# Patient Record
Sex: MALE | Employment: FULL TIME | URBAN - METROPOLITAN AREA
[De-identification: names, ages, dates, MRNs, and addresses within clinical notes are randomized per-mention and may not be internally consistent; named-entity substitution may affect disease eponyms.]

---

## 2018-02-13 ENCOUNTER — OFFICE VISIT (OUTPATIENT)
Dept: FAMILY MEDICINE | Facility: CLINIC | Age: 58
End: 2018-02-13

## 2018-02-13 VITALS
TEMPERATURE: 97.8 F | HEART RATE: 91 BPM | DIASTOLIC BLOOD PRESSURE: 88 MMHG | HEIGHT: 71 IN | OXYGEN SATURATION: 97 % | WEIGHT: 226 LBS | BODY MASS INDEX: 31.64 KG/M2 | SYSTOLIC BLOOD PRESSURE: 138 MMHG

## 2018-02-13 DIAGNOSIS — E11.8 TYPE 2 DIABETES MELLITUS WITH COMPLICATION, WITHOUT LONG-TERM CURRENT USE OF INSULIN (H): ICD-10-CM

## 2018-02-13 DIAGNOSIS — Z02.89 HEALTH EXAMINATION OF DEFINED SUBPOPULATION: Primary | ICD-10-CM

## 2018-02-13 LAB
BILIRUB UR QL: ABNORMAL
CLARITY: CLEAR
COLOR UR: YELLOW
GLUCOSE URINE: 1000 MG/DL
HBA1C MFR BLD: 7.4 % (ref 4.3–6)
HGB UR QL: ABNORMAL
KETONES UR QL: ABNORMAL MG/DL
NITRITE UR QL STRIP: ABNORMAL
PH UR STRIP: 7 PH (ref 5–7)
PROT UR QL: ABNORMAL MG/DL
SP GR UR STRIP: 1.02 (ref 1–1.03)
SPECIMEN VOL UR: ABNORMAL ML
UROBILINOGEN UR QL STRIP: 0.2 EU/DL (ref 0.2–1)
WBC #/AREA URNS HPF: ABNORMAL /[HPF]

## 2018-02-13 PROCEDURE — 81003 URINALYSIS AUTO W/O SCOPE: CPT | Performed by: NURSE PRACTITIONER

## 2018-02-13 PROCEDURE — 36415 COLL VENOUS BLD VENIPUNCTURE: CPT | Performed by: NURSE PRACTITIONER

## 2018-02-13 PROCEDURE — 99499 UNLISTED E&M SERVICE: CPT | Performed by: NURSE PRACTITIONER

## 2018-02-13 PROCEDURE — 83036 HEMOGLOBIN GLYCOSYLATED A1C: CPT | Performed by: NURSE PRACTITIONER

## 2018-02-13 RX ORDER — LISINOPRIL 40 MG/1
40 TABLET ORAL DAILY
Qty: 90 TABLET | Refills: 1 | COMMUNITY
Start: 2018-02-13

## 2018-02-13 RX ORDER — ATORVASTATIN CALCIUM 10 MG/1
10 TABLET, FILM COATED ORAL DAILY
Qty: 30 TABLET | Refills: 1 | COMMUNITY
Start: 2018-02-13

## 2018-02-13 NOTE — PROGRESS NOTES
"Form MCSA-5875 (revised 2015)                                       B No. [id number]  Expiration Date: 2018    MEDICAL EXAMINATION REPORT FORM  (FOR  MEDICAL CERTIFICATION)    SECTION 1.  Information (to be filled out by )    PERSONAL INFORMATION  Last Name: Jonny  First Name: Gerhard Brooks Initial: ZOYA  : 60      Age: 57        Street Address: [address] [place] [address]   City: [place]   State/Province: [place]   Zip Code: [address]  's License Number: [id number]      Issuing State/Province: [place]       Phone: [phone]     Gender: male  E-mail (optional): none  CLP/CDL Applicant Sung*: yes   ID Verified by**:  Ama PAZ  Has your USDOT/FMCSA medical certificate ever been denied or issued for less than 2 years? NO   (*CLP/CDL Applicant/Sung: See instructions for definitions)  (** ID verified By:Record what type of photo ID was used to verify the identity of the , e.g., CDL,'s license, passport)     HEALTH HISTORY  Have you ever had surgery? If \"yes\", please list and explain below. NO    Are you currently taking medications (prescription, over-the-counter, herbal remedies, diet supplements)? If \"yes,\" please describe below. YES    Lipitor, Lisinopril, Omeprazole       Do you have or have you ever had:     1. Head/ brain injuries or illnesses (e.g., concussion)    NO     2. Seizures, epilepsy?   NO     3. Eye problems (except glasses or contacts)?   NO     4. Ear and/or hearing problems   NO     5. Heart disease, heart attack; bypass, or other heart problems   NO     6. Pacemaker, stents, implantable devices, or other heart procedures   NO     7. High blood pressure   YES     8. High cholesterol   YES     9. Chronic (long-term) cough, shortness of breath, or other breathing problems   NO   10. Lung disease (e.g. asthma)   NO   11. Kidney problems, kidney stones, or pain/problems with urination   NO   12. Stomach, liver, or digestive " "problems   NO   13. Diabetes or blood sugar problems        Insulin Used?   NO    NO   14. Anxiety, depression, nervousness, other mental health problems   NO   15. Fainting or passing out   NO   16. Dizziness, headaches, numbness, tingling, or memory loss?   NO   17. Unexplained weight loss   NO   18. Stroke, mini-stroke (TIA), paralysis, or weakness   NO   19. Missing or limited use of arm, hand, finger, leg, foot, toe   NO   20. Neck or back problems   NO   21. Bone, muscle, joint or nerve problems   NO   22. Blood clots or bleeding problems   NO   23. Cancer   NO   24. Chronic (long-term) infection or other chronic diseases   NO   25. Sleep disorders, pauses in breathing while asleep, daytime sleepiness, loud snoring?   NO   26. Have you ever had a sleep test (e.g. sleep apnea)?   NO   27. Have you ever spent a night in the hospital?   NO   28. Have you ever had a broken bone?   NO   29. Have you ever used or do you now use tobacco?   NO   30. Do you currently drink alcohol?   YES   31. Have you used an illegal substance within the past two years?   NO   32. Have you ever failed a drug test or been dependent on an illegal substance?   NO     Other health condition(s) not described above: NO    Did you answer \"yes\" to any of questions 1-32?  If so, please comment further on those health conditions below: NO           'S SIGNATURE  I certify that the above information is accurate and complete. I understand that inaccurate, false or missing information may invalidate the examination and my Medical Examiner's Certificate, that submission of fraudulent or intentionally false information is a violation of 49CFR 390.35, and that submission of fraudulent or intentionally false information may subject me to civil or ciminal penalties under 49 .37 and 49  Appendices A and B.     's signature:____________________________        Date: 2/13/2018                                         " "Signature if printed       Section 2. Examination Report (to be filled out by the medical examiner)      HEALTH HISTORY REVIEW  Review and discuss pertinent  answers and any available medical records. 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        none            TESTING     Pulse rate: 91     Pulse rhythm regular: YES  Height: 5 feet 10 1/2 inches  Weight: 226 pounds    Blood Pressure  Blood Pressure: 138 Systolic  88 Diastolic  Sitting yes  Second Reading (optional) not needed  Other Testing if indicated    none       Urinalysis  Urinalysis is required. Numerical readings must be recorded.  Urine Specimen Specific Gravity Protein Blood Sugar    1.020 NEGATIVE NEGATIVE +1000   Protein, blood or sugar in the urine may be an indication for further testing to rule out any underlying medical problem.    Vision  Standard is at least 20/40 acuity (Snellen) in each eye with or without correction. At least 70  field of vision in horizontal meridian measured in each eye. The use of corrective lenses should be noted on the Medical Examiner's Certificate.    ACUITY UNCORRECTED CORRECTED HORIZONTAL FIELD OF VISION   Right Eye 20/20 N/A Greater than 70 degrees   Left Eye 20/25 N/A Greater than 70 degrees   Both Eyes 20/20 N/A      Applicant can recognize and distinguish among traffic control signals and devices showing red, green and keagan colors? Yes    Monocular vision: No    Referred to ophthalmologist or optometrist?  No    Received documentation from ophthalmologist or optometrist?  No    HEARING   Standard: Must first perceive forced whispered voice at not less than 5 feet OR average hearing loss of less than or equal to 40 dB, in better ear (with or without hearing aid).    Check if hearing aid used for test:  Neither    Whispered voice test:    Record the distance from the individual at which a forced " whispered voice can first be heard:        Right Ear: 10 feet                  Left Ear: 10 feet             PHYSICAL EXAMINATION  The presence of a certain condition may not necessarily disqualify a , particularly if the condition is controlled adequately, is not likely to worsen, or is readily amenable to treatment. Even if a condition does not disqualify a , the Medical Examiner may consider deferring the  temporarily. Also, the  should be advised to take the necessary steps to correct the condition as soon as possible, particularly if neglecting the condition, could result in more serious illness that might affect driving.    Check the body systems for abnormalities.  BODY SYSTEM Normal or Abnormal   1. General  Normal   2. Skin Normal   3. Eyes Normal   4. Ears Normal   5. Mouth/throat Normal   6. Cardiovascular Normal   7. Lungs/chest Normal   8. Abdomen Normal   9. Genito-urinary System including hernias Normal   10. Back/Spine Normal   11. Extremities/joints Normal   12. Neurological system including reflexes Normal   13. Gait Normal   14. Vascular system Normal     Discuss any abnormal answers in detail in the space below and indicate whether it would affect the 's ability to operate a CMV. Enter applicable item number before each comment.     none            Please complete only one of the following (Federal or State) Medical Examiner Determination sections:    MEDICAL EXAMINER DETERMINATION (Federal)  Use this section for examinations performed in accordance with the Federal Motor Carrier Safety Regulations (49 ..49):    Meets standards in 49 .41; qualifies for 2-year certificate  Meets standards, but periodic monitoring required:   Every year, good for 1 year               If the  meets the standards outlined in 49 .41, then complete a Medical Examiner's Certificate as stated in 49 .43(h), as appropriate.     I have performed this  evaluation for certification. I have personally reviewed all available records and recorded information pertaining to this evaluation, and attest that to the best of my knowledge, I believe it to be true and correct.    Medical Examiner's Signature: _________________________________________                                                                                   (if printed)    Medical Examiner's Name: Ama Motta NP  Medical Examiner's Address:   [address] [address]  [phone]  Dept: [phone]    Date Certificate Signed: 2/13/2018    Medical Examiner's State License, Certificate, or Registration Number: [id number]    Issuing State:  [place]    Advanced Practice Nurse    National Registry Number:  [id number]                Medical Examiner's Certificate Expiration Date: 2/13/2019                          Date submitted to registry: 2/13/2018  Submitted by: Ama PAZ

## 2018-02-13 NOTE — NURSING NOTE
"No chief complaint on file.      Initial /86 (BP Location: Right arm, Patient Position: Chair, Cuff Size: Adult Large)  Pulse 91  Temp 97.8  F (36.6  C) (Oral)  Ht 5' 10.5\" (1.791 m)  Wt 226 lb (102.5 kg)  SpO2 97%  BMI 31.97 kg/m2 Estimated body mass index is 31.97 kg/(m^2) as calculated from the following:    Height as of this encounter: 5' 10.5\" (1.791 m).    Weight as of this encounter: 226 lb (102.5 kg).  Medication Reconciliation: complete     Ryan Worthy CMA      "

## 2018-02-13 NOTE — MR AVS SNAPSHOT
"              After Visit Summary   2018    Shannon [employer]    MRN: [id number]           Patient Information     Date Of Birth          1960        Visit Information        Provider Department      2018 1:00 PM Ama Motta NP [facility]        Today's Diagnoses     Health examination of defined subpopulation    -  1    Type 2 diabetes mellitus with complication, without long-term current use of insulin (H)           Follow-ups after your visit        Who to contact     If you have questions or need follow up information about today's clinic visit or your schedule please contact [facility] directly at [phone].  Normal or non-critical lab and imaging results will be communicated to you by [employer], letter or phone within 4 business days after the clinic has received the results. If you do not hear from us within 7 days, please contact the clinic through [employer] or phone. If you have a critical or abnormal lab result, we will notify you by phone as soon as possible.  Submit refill requests through [facility] or call your pharmacy and they will forward the refill request to us. Please allow 3 business days for your refill to be completed.          Additional Information About Your Visit        MyChart Information     [facility] lets you send messages to your doctor, view your test results, renew your prescriptions, schedule appointments and more. To sign up, go to [email or web address] . Click on \"Log in\" on the left side of the screen, which will take you to the Welcome page. Then click on \"Sign up Now\" on the right side of the page.     You will be asked to enter the access code listed below, as well as some personal information. Please follow the directions to create your username and password.     Your access code is: [email or web address]  Expires: 2018  2:26 PM     Your access code will  in 90 days. If you need help or a new code, please call your " "[facility] or [phone].        Care EveryWhere ID     This is your Care EveryWhere ID. This could be used by other organizations to access your [facility] medical records  [id number]        Your Vitals Were     Pulse Temperature Height Pulse Oximetry BMI (Body Mass Index)       91 97.8  F (36.6  C) (Oral) 5' 10.5\" (1.791 m) 97% 31.97 kg/m2        Blood Pressure from Last 3 Encounters:   02/13/18 138/88   02/18/16 138/84   03/10/15 138/90    Weight from Last 3 Encounters:   02/13/18 226 lb (102.5 kg)   03/03/15 238 lb (108 kg)              We Performed the Following     Hemoglobin A1c     OH U/A W/O MICRO          Today's Medication Changes          These changes are accurate as of 2/13/18  2:26 PM.  If you have any questions, ask your nurse or doctor.               Start taking these medicines.        Dose/Directions    metFORMIN 500 MG tablet   Commonly known as:  GLUCOPHAGE   Used for:  Type 2 diabetes mellitus with complication, without long-term current use of insulin (H)   Started by:  Ama Motta NP        Dose:  500 mg   Take 1 tablet (500 mg) by mouth 2 times daily (with meals)   Quantity:  180 tablet   Refills:  1            Where to get your medicines      These medications were sent to [facility] [address] [address]     Phone:  [phone]     metFORMIN 500 MG tablet                Primary Care Provider Fax #    Physician No Ref-Primary [phone]       No address on file        Equal Access to Services     ACE LINO : Mitzi madrigal Somaisha, waaxda luqadaha, qaybta kaalmada adeegyoel, kim baker. So [facility] [phone].    ATENCIÓN: Si habla español, tiene a bashir disposición servicios gratuitos de asistencia lingüística. Llame al [phone].    We comply with applicable federal civil rights laws and [place] laws. We do not discriminate on the basis of race, " color, national origin, age, disability, sex, sexual orientation, or gender identity.            Thank you!     Thank you for choosing [facility]  for your care. Our goal is always to provide you with excellent care. Hearing back from our patients is one way we can continue to improve our services. Please take a few minutes to complete the written survey that you may receive in the mail after your visit with us. Thank you!             Your Updated Medication List - Protect others around you: Learn how to safely use, store and throw away your medicines at www.disposemymeds.org.          This list is accurate as of 2/13/18  2:26 PM.  Always use your most recent med list.                   Brand Name Dispense Instructions for use Diagnosis    atorvastatin 10 MG tablet    LIPITOR    30 tablet    Take 1 tablet (10 mg) by mouth daily        lisinopril 40 MG tablet    PRINIVIL/ZESTRIL    90 tablet    Take 1 tablet (40 mg) by mouth daily        metFORMIN 500 MG tablet    GLUCOPHAGE    180 tablet    Take 1 tablet (500 mg) by mouth 2 times daily (with meals)    Type 2 diabetes mellitus with complication, without long-term current use of insulin (H)       omeprazole 20 MG CR capsule    priLOSEC    90 capsule    Take 1 capsule (20 mg) by mouth daily

## 2019-02-11 ENCOUNTER — OFFICE VISIT (OUTPATIENT)
Dept: FAMILY MEDICINE | Facility: CLINIC | Age: 59
End: 2019-02-11

## 2019-02-11 DIAGNOSIS — Z02.89 HEALTH EXAMINATION OF DEFINED SUBPOPULATION: Primary | ICD-10-CM

## 2019-02-11 LAB
BILIRUB UR QL: NORMAL
CLARITY: CLEAR
COLOR UR: YELLOW
GLUCOSE URINE: 250 MG/DL
HGB UR QL: NORMAL
KETONES UR QL: NORMAL MG/DL
NITRITE UR QL STRIP: NORMAL
PH UR STRIP: 6.5 PH (ref 5–7)
PROT UR QL: NORMAL MG/DL
SP GR UR STRIP: 1.01 (ref 1–1.03)
SPECIMEN VOL UR: NORMAL ML
UROBILINOGEN UR QL STRIP: 0.2 EU/DL (ref 0.2–1)
WBC #/AREA URNS HPF: NORMAL /[HPF]

## 2019-02-11 PROCEDURE — 99499 UNLISTED E&M SERVICE: CPT | Performed by: FAMILY MEDICINE

## 2019-02-11 PROCEDURE — 81003 URINALYSIS AUTO W/O SCOPE: CPT | Performed by: FAMILY MEDICINE

## 2019-02-11 NOTE — PROGRESS NOTES
"Form MCSA-5875                                  [facility] No. [id number]  Expiration Date: 2019  MEDICAL EXAMINATION REPORT FORM  (FOR  MEDICAL CERTIFICATION)    SECTION 1.  Information (to be filled out by )    PERSONAL INFORMATION    Last Name: Jonny  First Name: Gerhard Brooks Initial: ZOYA  : [birth year]      Age: 58        Street Address:[address] [place] [address]   City: [place]    State/Province: [place]   Zip Code: [address]  's License Number: [id number]      Issuing State/Province: [place]       Phone: [phone]     Gender: male  E-mail (optional):   CLP/CDL Applicant Sung: yes   ID Verified by  Dionna Galo CMA    Has your USDOT/FMCSA medical certificate ever been denied or issued for less than 2 years? NO     (*CLP/CDL Applicant/Sung: See instructions for definitions)  (** ID verified By:Record what type of photo ID was used to verify the identity of the , e.g., CDL,'s license, passport)       HEALTH HISTORY    Have you ever had surgery? If \"yes\", please list and explain below.  [  ] Yes [  ]  No  [  ] Not Sure         Are you currently taking medications (prescription, over-the-counter, herbal remedies, diet supplements)? If \"yes,\" please describe below.   [x  ] Yes [  ]  No  [  ] Not Sure    Lipitor 40 mg   Lostartan 100 mg       HEALTH HISTORY (continued)          Do you have or have you ever had:                                                     Not  Yes    No     Sure                                                                          Not    Yes     No   Sure    1. Head/brain injuries or illness (e.g., concussion)  x     16. Dizziness, headaches, numbness, tingling, or memory loss  x       2. Seizures, epilepsy  xx     17. Unexplained weight loss  x       3. Eye problems (except glasses or contacts)       18. Stroke, mini stroke (TIA), paralysis, or weakness  x       4. Ear and/or hearing problems  x     19. Missing or limited use of " "arm, hand, finger, leg, foot, toe  x       5. Heart disease, heart attack, bypass, or other heart problems  x     20. Neck or back problems  x       6. Pacemaker, stents, implantable devices, or other heart procedures  x     21. Bone, muscle, joint or nerve problems  x       7. High blood preassure x      22. Blood clots or bleeding problems  x       8. High cholesterol x      23. Cancer  x       9. Chronic (long term) cough, shortness of breath, or other breathing problems  x     24. Chronic (long term) infection or other chronic disease  x       10. Lung disease (e.g., asthma)  x     25. Sleep disorders, pauses in breathing while asleep, daytime sleepiness, loud snoring  x       11. Kidney problems, kidney stones, or pain/problems with urination  x     26. Have you ever had a sleep test? (e.g., sleep apnea)  x       12. Stomach, liver, or digestive problems  x     27. Have you ever spent the night in the hospital?  xx       13. Diabetes or blood sugar problems x      28. Have you ever had a broken bone?  x             Insulin used  x     29. Have you ever used or do you now use tobacco?  x       14. Anxiety, depression, nervousness, other mental health problems  x     30. Do you currently drink alcohol?   x       15. Fainting or passing out  x     31.  Have you used an illegal substance within the past two years?   x         32. Have you ever failed a drug test or been dependent on an illegal substance?   x         Other health condition(s) not described above: [  ] Yes [  ]  No  [  ] Not Sure         Did you answer \"yes\" to any of questions 1-32?  If so, please comment further on those health conditions below: [  ] Yes [  ]  No  [  ] Not Sure                'S SIGNATURE  I certify that the above information is accurate and complete. I understand that inaccurate, false or missing information may invalidate the examination and my Medical Examiner's Certificate, that submission of fraudulent or " "intentionally false information is a violation of 49CFR 390.35, and that submission of fraudulent or intentionally false information may subject me to civil or ciminal penalties under 49 .37 and 49  Appendices A and B.     's signature:____________________________        Date: 2/11/2019                                         Signature if printed       Section 2. Examination Report (to be filled out by the medical examiner)      HEALTH HISTORY REVIEW  Review and discuss pertinent  answers and any available medical records. 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       Diabetes, no medsd 7.3 on A1C today  Htn, meds  Hyperlipidemia, meds          TESTING     Pulse rate: 88     Pulse rhythm regular: NO  Height: 5'10.50\"   Weight: 225#     Blood Pressure  Blood Pressure: 138/88   Sitting   Second Reading (optional) 138/88  Other Testing if indicated           Urinalysis  Urinalysis is required. Numerical readings must be recorded.  Urine Specimen Specific Gravity Protein Blood Sugar    1.015 NEGATIVE NEGATIVE POSITIVE   Protein, blood or sugar in the urine may be an indication for further testing to rule out any underlying medical problem.  Known diabetes   Vision  Standard is at least 20/40 acuity (Snellen) in each eye with or without correction. At least 70  field of vision in horizontal meridian measured in each eye. The use of corrective lenses should be noted on the Medical Examiner's Certificate.    ACUITY UNCORRECTED CORRECTED HORIZONTAL FIELD OF VISION   Right Eye 20/25 N/A Greater than 70 degrees   Left Eye 20/32 N/A Greater than 70 degrees   Both Eyes 20/20 N/A      Applicant can recognize and distinguish among traffic control signals and devices showing red, green and keagan colors?  yes    Monocular vision: No    Referred to ophthalmologist or optometrist?  " No    Received documentation from ophthalmologist or optometrist?  No    HEARING   Standard: Must first perceive forced whispered voice at not less than 5 feet OR average hearing loss of less than or equal to 40 dB, in better ear (with or without hearing aid).    Check if hearing aid used for test:  Neither  Whispered voice test:    Record the distance from the individual at which a forced whispered voice can first be heard:        Right Ear: greater than 5 feet                  Left Ear: greater than 5 feet             PHYSICAL EXAMINATION  The presence of a certain condition may not necessarily disqualify a , particularly if the condition is controlled adequately, is not likely to worsen, or is readily amenable to treatment. Even if a condition does not disqualify a , the Medical Examiner may consider deferring the  temporarily. Also, the  should be advised to take the necessary steps to correct the condition as soon as possible, particularly if neglecting the condition, could result in more serious illness that might affect driving.    Check the body systems for abnormalities.  BODY SYSTEM Normal or Abnormal   1. General  Normal   2. Skin Normal   3. Eyes Normal   4. Ears Normal   5. Mouth/throat Normal   6. Cardiovascular Normal   7. Lungs/chest Normal   8. Abdomen Normal   9. Genito-urinary System including hernias Normal   10. Back/Spine Normal   11. Extremities/joints Normal   12. Neurological system including reflexes Normal   13. Gait Normal   14. Vascular system Normal     Discuss any abnormal answers in detail in the space below and indicate whether it would affect the 's ability to operate a CMV. Enter applicable item number before each comment.                 Please complete only one of the following (Federal or State) Medical Examiner Determination sections:    MEDICAL EXAMINER DETERMINATION (Federal)  Use this section for examinations performed in accordance with the  Federal Motor Carrier Safety Regulations (49 ..49):     qualified for: 1 year with no applicable stipulations            If the  meets the standards outlined in 49 .41, then complete a Medical Examiner's Certificate as stated in 49 .43(h), as appropriate.     I have performed this evaluation for certification. I have personally reviewed all available records and recorded information pertaining to this evaluation, and attest that to the best of my knowledge, I believe it to be true and correct.    Medical Examiner's Signature: _________________________________________                                                                                   (if printed)    Medical Examiner's Name: Mendez Kenney MD  Medical Examiner's Address:   [address] [address]  [phone]  Dept: [phone]    Date Certificate Signed: 2/11/19    Medical Examiner's State License, Certificate, or Registration Number: [id number]    Issuing State[place]    National Registry Number:  [id number]                 Medical Examiner's Certificate Expiration Date: 2/11/2020                          Date submitted to registry: 02/12/2019  Submitted by: Jonathan Gonzalez MA

## 2020-02-03 ENCOUNTER — OFFICE VISIT (OUTPATIENT)
Dept: FAMILY MEDICINE | Facility: CLINIC | Age: 60
End: 2020-02-03

## 2020-02-03 DIAGNOSIS — Z02.89 HEALTH EXAMINATION OF DEFINED SUBPOPULATION: Primary | ICD-10-CM

## 2020-02-03 LAB
BILIRUB UR QL: NORMAL
CLARITY: CLEAR
COLOR UR: YELLOW
GLUCOSE URINE: 250 MG/DL
HGB UR QL: NORMAL
KETONES UR QL: NORMAL MG/DL
NITRITE UR QL STRIP: NORMAL
PH UR STRIP: 7 PH (ref 5–7)
PROT UR QL: NORMAL MG/DL
SP GR UR STRIP: 1.02 (ref 1–1.03)
SPECIMEN VOL UR: NORMAL ML
UROBILINOGEN UR QL STRIP: 0.2 EU/DL (ref 0.2–1)
WBC #/AREA URNS HPF: NORMAL /[HPF]

## 2020-02-03 PROCEDURE — 81003 URINALYSIS AUTO W/O SCOPE: CPT | Performed by: FAMILY MEDICINE

## 2020-02-03 PROCEDURE — 99499 UNLISTED E&M SERVICE: CPT | Performed by: FAMILY MEDICINE

## 2020-02-03 NOTE — PROGRESS NOTES
"Form MCSA-5875 (revised 2015)                                       B No. [id number]  Expiration Date: 2019    MEDICAL EXAMINATION REPORT FORM  (FOR  MEDICAL CERTIFICATION)    SECTION 1.  Information (to be filled out by )    PERSONAL INFORMATION    Last Name: Jonny  First Name: Gerhard Brooks Initial: ZOYA  : 1960      Age: 59        Street Address: [address] [place] [address]   City: [place]   State/Province: [place]   Zip Code: [address]  's License Number: [id number]      Issuing State/Province: [place]       Phone: [phone]     Gender: male  E-mail (optional):   [employer] Applicant Sung*: yes   ID Verified by**:  's license  Has your [employer]OT/F[employer]SA medical certificate ever been denied or issued for less than 2 years?  NO     (*CLP/CDL Applicant/Sung: See instructions for definitions)  (** ID verified By:Record what type of photo ID was used to verify the identity of the , e.g., CDL,'s license, passport)       HEALTH HISTORY    Have you ever had surgery? If \"yes\", please list and explain below.   YES    PMH    Type 2 DM  Htn  Lipids    PSH    Appendix - 2012    Meds    Dilt-XR 120mg  Tiazac 120 mg  Hydrodiuril 12.5mg  Cozaar 100mg  Glucophage 500mg    NKDA       Are you currently taking medications (prescription, over-the-counter, herbal remedies, diet supplements)? If \"yes,\" please describe below.     YES    Dilt-XR 120mg  Tiazac 120 mg  Hydrodiuril 12.5mg  Cozaar 100mg  Glucophage 500mg      HEALTH HISTORY (continued)          Do you have or have you ever had:                                                     Not  Yes    No     Sure                                                                          Not    Yes     No   Sure    1. Head/brain injuries or illness (e.g., concussion)  NO 16. Dizziness, headaches, numbness, tingling, or memory loss   NO   2. Seizures, epilepsy  NO 17. Unexplained weight loss   NO   3. Eye " "problems (except glasses or contacts)  NO 18. Stroke, mini stroke (TIA), paralysis, or weakness   NO   4. Ear and/or hearing problems   NO 19. Missing or limited use of arm, hand, finger, leg, foot, toe   NO   5. Heart disease, heart attack, bypass, or other heart problems   NO 20. Neck or back problems   NO   6. Pacemaker, stents, implantable devices, or other heart procedures   NO 21. Bone, muscle, joint or nerve problems   NO   7. High blood preassure   Yes 22. Blood clots or bleeding problems   NO   8. High cholesterol   NO 23. Cancer   NO   9. Chronic (long term) cough, shortness of breath, or other breathing problems   NO 24. Chronic (long term) infection or other chronic disease   NO   10. Lung disease (e.g., asthma)   NO 25. Sleep disorders, pauses in breathing while asleep, daytime sleepiness, loud snoring   NO   11. Kidney problems, kidney stones, or pain/problems with urination   NO 26. Have you ever had a sleep test? (e.g., sleep apnea)   NO   12. Stomach, liver, or digestive problems   NO 27. Have you ever spent the night in the hospital?   NO   13. Diabetes or blood sugar problems   Yes 28. Have you ever had a broken bone?   NO         Insulin used   NO 29. Have you ever used or do you now use tobacco?   NO   14. Anxiety, depression, nervousness, other mental health problems  NO 30. Do you currently drink alcohol?    NO   15. Fainting or passing out   NO 31.  Have you used an illegal substance within the past two years?    NO     32. Have you ever failed a drug test or been dependent on an illegal substance?    NO     Other health condition(s) not described above:   NO    See above     Did you answer \"yes\" to any of questions 1-32?  If so, please comment further on those health conditions below:   NO                'S SIGNATURE  I certify that the above information is accurate and complete. I understand that inaccurate, false or missing information may invalidate the examination and my " "Medical Examiner's Certificate, that submission of fraudulent or intentionally false information is a violation of 49CFR 390.35, and that submission of fraudulent or intentionally false information may subject me to civil or ciminal penalties under 49 .37 and 49  Appendices A and B.     's signature:____________________________        Date: 2/3/2020                                         Signature if printed       Section 2. Examination Report (to be filled out by the medical examiner)      HEALTH HISTORY REVIEW  Review and discuss pertinent  answers and any available medical records. 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       Extensive review, see above            TESTING     Pulse rate: 87     Pulse rhythm regular: YES  Height: 6 feet 0 inches  Weight: 226 pounds    Blood Pressure  Blood Pressure: 136 Systolic  82 Diastolic  Sitting yes  Second Reading (optional) NA  Other Testing if indicated    NA       Urinalysis  Urinalysis is required. Numerical readings must be recorded.  Urine Specimen Specific Gravity Protein Blood Sugar    1.020 NEGATIVE NEGATIVE 1+   Protein, blood or sugar in the urine may be an indication for further testing to rule out any underlying medical problem.    Vision  Standard is at least 20/40 acuity (Snellen) in each eye with or without correction. At least 70  field of vision in horizontal meridian measured in each eye. The use of corrective lenses should be noted on the Medical Examiner's Certificate.    ACUITY UNCORRECTED CORRECTED HORIZONTAL FIELD OF VISION   Right Eye 20/25 N/A Greater than 70 degrees   Left Eye 20/40 N/A Greater than 70 degrees   Both Eyes 20/20 N/A      Applicant can recognize and distinguish among traffic control signals and devices showing red, green and keagan colors? Yes    Monocular vision: No    Referred to " ophthalmologist or optometrist?  No    Received documentation from ophthalmologist or optometrist?  No    HEARING   Standard: Must first perceive forced whispered voice at not less than 5 feet OR average hearing loss of less than or equal to 40 dB, in better ear (with or without hearing aid).    Check if hearing aid used for test:  Neither    Whispered voice test:    Record the distance from the individual at which a forced whispered voice can first be heard:        Right Ear: greater than 5 feet                  Left Ear: greater than 5 feet             PHYSICAL EXAMINATION  The presence of a certain condition may not necessarily disqualify a , particularly if the condition is controlled adequately, is not likely to worsen, or is readily amenable to treatment. Even if a condition does not disqualify a , the Medical Examiner may consider deferring the  temporarily. Also, the  should be advised to take the necessary steps to correct the condition as soon as possible, particularly if neglecting the condition, could result in more serious illness that might affect driving.    Check the body systems for abnormalities.  BODY SYSTEM Normal or Abnormal   1. General  Normal   2. Skin Normal   3. Eyes Normal   4. Ears Normal   5. Mouth/throat Normal   6. Cardiovascular Normal   7. Lungs/chest Normal   8. Abdomen Normal   9. Genito-urinary System including hernias Normal   10. Back/Spine Normal   11. Extremities/joints Normal   12. Neurological system including reflexes Normal   13. Gait Normal   14. Vascular system Normal     Discuss any abnormal answers in detail in the space below and indicate whether it would affect the 's ability to operate a CMV. Enter applicable item number before each comment.     no concerns            Please complete only one of the following (Federal or State) Medical Examiner Determination sections:    MEDICAL EXAMINER DETERMINATION (Federal)  Use this section for  examinations performed in accordance with the Federal Motor Carrier Safety Regulations (49 ..49):    [  ] Does not meet standards (specify reason) ________________________________________________________________________________  [  ] Meets standards in 49 .41; qualifies for 2-year certificate  [ X ] Meets standards, but periodic monitoring required (specify reason) ___________________________________________________________         qualified for:   [  ] 3 months               [  ] 6 months               [ X ] 1 year            [  ] other (specify): ________________________________  [  ]  Wearing corrective lenses        [  ] Wearing hearing aid        [  ] Accompanied by a waiver/exception(specify type): ____________________  [  ] Accompanied by a Skill Performance Evaluation (SPE) Certificate    [  ]  Qualified by operation of 49 .64 (Federal)  [  ] Driving within an exempt intracity zone (see 49 .62) (Federal)  [  ] Determination pending (specify reason) __________________________________________________________________________________        [  ] Return to medical exam office for follow-up on (must be 45 days or less _______________________________        [  ] Medical Examination Report amended (specify reason) ______________________________________________                    (if amended) Medical Examiner's Signature _____________________________________________________ Date: _______________  [  ] Incomplete examination (specify reason): _________________________________________________________________________________            If the  meets the standards outlined in 49 .41, then complete a Medical Examiner's Certificate as stated in 49 .43(h), as appropriate.     I have performed this evaluation for certification. I have personally reviewed all available records and recorded information pertaining to this evaluation, and attest that to the best of my  knowledge, I believe it to be true and correct.    Medical Examiner's Signature: _________________________________________                                                                                   (if printed)    Medical Examiner's Name: Kimani Ruiz MD  Medical Examiner's Address:   [address] [id number] [phone]  Dept: [phone]    Date Certificate Signed: 2/3/2020    Medical Examiner's State License, Certificate, or Registration Number: [id number]    Issuing State[place]    [ X ] M.D.   [  ] D.O.   [  ] Physician Assistant   [  ] Chiropractor   [  ] Advanced Practice Nurse  [  ] Other Practitioner (specify) __________________________________________________    National Registry Number:  [id number]                Medical Examiner's Certificate Expiration Date: 2/3/2021                          Date submitted to registry: 2/3/20  Submitted by: Yashira Perez Medical Assistant

## 2021-01-15 ENCOUNTER — OFFICE VISIT (OUTPATIENT)
Dept: FAMILY MEDICINE | Facility: CLINIC | Age: 61
End: 2021-01-15

## 2021-01-15 DIAGNOSIS — Z02.89 ENCOUNTER FOR EXAMINATION REQUIRED BY DEPARTMENT OF TRANSPORTATION (DOT): Primary | ICD-10-CM

## 2021-01-15 LAB
BILIRUB UR QL: NORMAL
CLARITY: CLEAR
COLOR UR: YELLOW
GLUCOSE URINE: 500 MG/DL
HGB UR QL: NORMAL
KETONES UR QL: NORMAL MG/DL
NITRITE UR QL STRIP: NORMAL
PH UR STRIP: 5.5 PH (ref 5–7)
PROT UR QL: NORMAL MG/DL
SP GR UR STRIP: 1.01 (ref 1–1.03)
SPECIMEN VOL UR: NORMAL ML
UROBILINOGEN UR QL STRIP: 0.2 EU/DL (ref 0.2–1)
WBC #/AREA URNS HPF: NORMAL /[HPF]

## 2021-01-15 PROCEDURE — 99499 UNLISTED E&M SERVICE: CPT | Performed by: FAMILY MEDICINE

## 2021-01-15 PROCEDURE — 81003 URINALYSIS AUTO W/O SCOPE: CPT | Performed by: FAMILY MEDICINE

## 2021-01-15 NOTE — PROGRESS NOTES
"Form MCSA-5875 (revised 2015)                                       B No. [id number]  Expiration Date: 2019    MEDICAL EXAMINATION REPORT FORM  (FOR  MEDICAL CERTIFICATION)    SECTION 1.  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  YES     (*CLP/CDL Applicant/Sung: See instructions for definitions)  (** ID verified By:Record what type of photo ID was used to verify the identity of the , e.g., CDL,'s license, passport)       HEALTH HISTORY    Have you ever had surgery? If \"yes\", please list and explain below.   Yes    PMH     Type 2 DM  Htn  Lipids     PSH     Appendix - 2012     Meds     Diltiazem 120 mg  Losartan 100 mg  HCTZ 12.5 mg  Metformin 500 mg  Crestor 10 mg  Omeprazole 20 mg     NKDA     Are you currently taking medications (prescription, over-the-counter, herbal remedies, diet supplements)? If \"yes,\" please describe below.     YES    As above      HEALTH HISTORY (continued)          Do you have or have you ever had:                                                     Not  Yes    No     Sure                                                                          Not    Yes     No   Sure    1. Head/brain injuries or illness (e.g., concussion)  NO 16. Dizziness, headaches, numbness, tingling, or memory loss   NO   2. Seizures, epilepsy  NO 17. Unexplained weight loss   NO   3. Eye problems (except glasses or contacts)  NO 18. " "Stroke, mini stroke (TIA), paralysis, or weakness   NO   4. Ear and/or hearing problems   NO 19. Missing or limited use of arm, hand, finger, leg, foot, toe   NO   5. Heart disease, heart attack, bypass, or other heart problems   NO 20. Neck or back problems   NO   6. Pacemaker, stents, implantable devices, or other heart procedures   NO 21. Bone, muscle, joint or nerve problems   NO   7. High blood preassure   YES 22. Blood clots or bleeding problems   NO   8. High cholesterol   YES 23. Cancer   NO   9. Chronic (long term) cough, shortness of breath, or other breathing problems   NO 24. Chronic (long term) infection or other chronic disease   NO   10. Lung disease (e.g., asthma)   NO 25. Sleep disorders, pauses in breathing while asleep, daytime sleepiness, loud snoring   NO   11. Kidney problems, kidney stones, or pain/problems with urination   NO 26. Have you ever had a sleep test? (e.g., sleep apnea)   NO   12. Stomach, liver, or digestive problems   NO 27. Have you ever spent the night in the hospital?   NO   13. Diabetes or blood sugar problems   YES 28. Have you ever had a broken bone?   NO         Insulin used   NO 29. Have you ever used or do you now use tobacco?   NO   14. Anxiety, depression, nervousness, other mental health problems  NO 30. Do you currently drink alcohol?    YES  Seldom   15. Fainting or passing out   NO 31.  Have you used an illegal substance within the past two years?    NO     32. Have you ever failed a drug test or been dependent on an illegal substance?    NO     Other health condition(s) not described above:   NO         Did you answer \"yes\" to any of questions 1-32?  If so, please comment further on those health conditions below:   YES    DM II - on medications  HTN - on medications            'S SIGNATURE  I certify that the above information is accurate and complete. I understand that inaccurate, false or missing information may invalidate the examination and my " "Medical Examiner's Certificate, that submission of fraudulent or intentionally false information is a violation of 49CFR 390.35, and that submission of fraudulent or intentionally false information may subject me to civil or ciminal penalties under 49 .37 and 49  Appendices A and B.     's signature:____________________________        Date: 1/15/2021                                         Signature if printed       Section 2. Examination Report (to be filled out by the medical examiner)      HEALTH HISTORY REVIEW  Review and discuss pertinent  answers and any available medical records. 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       Extensive review - see above            TESTING     Pulse rate: 94     Pulse rhythm regular: YES  Height: 6 feet 0 inches  Weight: 229 pounds    Blood Pressure  Blood Pressure: 136 Systolic  84 Diastolic  Sitting yes  Second Reading (optional) NA  Other Testing if indicated    NA       Urinalysis  Urinalysis is required. Numerical readings must be recorded.  Urine Specimen Specific Gravity Protein Blood Sugar    1.015 NEGATIVE NEGATIVE 2+   Protein, blood or sugar in the urine may be an indication for further testing to rule out any underlying medical problem.    Vision  Standard is at least 20/40 acuity (Snellen) in each eye with or without correction. At least 70  field of vision in horizontal meridian measured in each eye. The use of corrective lenses should be noted on the Medical Examiner's Certificate.    ACUITY UNCORRECTED CORRECTED HORIZONTAL FIELD OF VISION   Right Eye 20/20 N/A Greater than 70 degrees   Left Eye 20/25 N/A Greater than 70 degrees   Both Eyes 20/20 N/A      Applicant can recognize and distinguish among traffic control signals and devices showing red, green and keagan colors? Yes    Monocular vision: No    Referred to " ophthalmologist or optometrist?  No    Received documentation from ophthalmologist or optometrist?  No    HEARING   Standard: Must first perceive forced whispered voice at not less than 5 feet OR average hearing loss of less than or equal to 40 dB, in better ear (with or without hearing aid).    Check if hearing aid used for test:  Neither    Whispered voice test:    Record the distance from the individual at which a forced whispered voice can first be heard:        Right Ear: greater than 5 feet                  Left Ear: greater than 5 feet             PHYSICAL EXAMINATION  The presence of a certain condition may not necessarily disqualify a , particularly if the condition is controlled adequately, is not likely to worsen, or is readily amenable to treatment. Even if a condition does not disqualify a , the Medical Examiner may consider deferring the  temporarily. Also, the  should be advised to take the necessary steps to correct the condition as soon as possible, particularly if neglecting the condition, could result in more serious illness that might affect driving.    Check the body systems for abnormalities.  BODY SYSTEM Normal or Abnormal   1. General  Normal   2. Skin Normal   3. Eyes Normal   4. Ears Normal   5. Mouth/throat Normal   6. Cardiovascular Normal   7. Lungs/chest Normal   8. Abdomen Normal   9. Genito-urinary System including hernias Normal   10. Back/Spine Normal   11. Extremities/joints Normal   12. Neurological system including reflexes Normal   13. Gait Normal   14. Vascular system Normal     Discuss any abnormal answers in detail in the space below and indicate whether it would affect the 's ability to operate a CMV. Enter applicable item number before each comment.     reviewed, no concerns            Please complete only one of the following (Federal or State) Medical Examiner Determination sections:    MEDICAL EXAMINER DETERMINATION (Federal)  Use this  section for examinations performed in accordance with the Federal Motor Carrier Safety Regulations (49 ..49):    [  ] Does not meet standards (specify reason) ________________________________________________________________________________  [  ] Meets standards in 49 .41; qualifies for 2-year certificate  [ X ] Meets standards, but periodic monitoring required (specify reason) ___________________________________________________________         qualified for:   [  ] 3 months               [  ] 6 months               [ X ] 1 year            [  ] other (specify): ________________________________  [  ]  Wearing corrective lenses        [  ] Wearing hearing aid        [  ] Accompanied by a waiver/exception(specify type): ____________________  [  ] Accompanied by a Skill Performance Evaluation (SPE) Certificate    [  ]  Qualified by operation of 49 .64 (Federal)  [  ] Driving within an exempt intracity zone (see 49 .62) (Federal)  [  ] Determination pending (specify reason) __________________________________________________________________________________        [  ] Return to medical exam office for follow-up on (must be 45 days or less _______________________________        [  ] Medical Examination Report amended (specify reason) ______________________________________________                    (if amended) Medical Examiner's Signature _____________________________________________________ Date: _______________  [  ] Incomplete examination (specify reason): _________________________________________________________________________________            If the  meets the standards outlined in 49 .41, then complete a Medical Examiner's Certificate as stated in 49 .43(h), as appropriate.     I have performed this evaluation for certification. I have personally reviewed all available records and recorded information pertaining to this evaluation, and attest that to the  best of my knowledge, I believe it to be true and correct.    Medical Examiner's Signature: _________________________________________                                                                                   (if printed)    Medical Examiner's Name: Kimani Ruiz MD  Medical Examiner's Address:   [address] [address]  [phone]  Dept: [phone]    Date Certificate Signed: 1/15/2021      Medical Examiner's State License, Certificate, or Registration Number: [id number]    Issuing State[place]    [ X ] [email or web address]   [  ] D.O.   [  ] Physician Assistant   [  ] Chiropractor   [  ] Advanced Practice Nurse  [  ] Other Practitioner (specify) __________________________________________________    National Registry Number:  [id number]                Medical Examiner's Certificate Expiration Date: 01/15/2022                          Date submitted to registry: 1/19/21  Submitted by: Yashira Perez Medical Assistant

## 2022-01-07 ENCOUNTER — OFFICE VISIT (OUTPATIENT)
Dept: FAMILY MEDICINE | Facility: CLINIC | Age: 62
End: 2022-01-07

## 2022-01-07 VITALS
OXYGEN SATURATION: 98 % | DIASTOLIC BLOOD PRESSURE: 86 MMHG | TEMPERATURE: 97.2 F | SYSTOLIC BLOOD PRESSURE: 138 MMHG | HEIGHT: 72 IN | BODY MASS INDEX: 32.1 KG/M2 | WEIGHT: 237 LBS | RESPIRATION RATE: 16 BRPM | HEART RATE: 112 BPM

## 2022-01-07 DIAGNOSIS — Z02.89 ENCOUNTER FOR EXAMINATION REQUIRED BY DEPARTMENT OF TRANSPORTATION (DOT): Primary | ICD-10-CM

## 2022-01-07 LAB
BILIRUB UR QL: NORMAL
CLARITY: CLEAR
COLOR UR: YELLOW
GLUCOSE URINE: 1000 MG/DL
HGB UR QL: NORMAL
KETONES UR QL: NORMAL MG/DL
NITRITE UR QL STRIP: NORMAL
PH UR STRIP: 5.5 PH (ref 5–7)
PROT UR QL: NORMAL MG/DL
SP GR UR STRIP: 1.01 (ref 1–1.03)
SPECIMEN VOL UR: NORMAL ML
UROBILINOGEN UR QL STRIP: 0.2 EU/DL (ref 0.2–1)
WBC #/AREA URNS HPF: NORMAL /[HPF]

## 2022-01-07 PROCEDURE — 81003 URINALYSIS AUTO W/O SCOPE: CPT | Performed by: FAMILY MEDICINE

## 2022-01-07 PROCEDURE — 99499 UNLISTED E&M SERVICE: CPT | Performed by: FAMILY MEDICINE

## 2022-01-07 ASSESSMENT — MIFFLIN-ST. JEOR: SCORE: 1918.02

## 2022-01-07 NOTE — PROGRESS NOTES
"Form MCSA-5875 (revised 2015)                                       [facility] No. [id number]  Expiration Date: 2019    MEDICAL EXAMINATION REPORT FORM  (FOR  MEDICAL CERTIFICATION)    SECTION 1.  Information (to be filled out by )    PERSONAL INFORMATION    Last Name: Jonny  First Name: Gerhard Brooks Initial: ZOYA  : 1960      Age: 61        Street Address: [address] [place] [address]   City: [place]  State/Province: [place]   Zip Code: [address]  's License Number: [id number]      Issuing State/Province: [place]       Phone:[phone]     Gender: male  E-mail (optional): [email or web address]  CLP/CDL Applicant Sung*: yes   ID Verified by**:  Kayleigh Jeronimo MA  Has your USDOT/FMCSA medical certificate ever been denied or issued for less than 2 years?  NO     (*CLP/CDL Applicant/Sung: See instructions for definitions)  (** ID verified By:Record what type of photo ID was used to verify the identity of the , e.g., CDL,'s license, passport)       HEALTH HISTORY    Have you ever had surgery? If \"yes\", please list and explain below.   YES    PMH     Type 2 DM  Htn  Lipids     PSH     Appendix - 2012  Heart Ablation     Meds    Rosuvastatin 10 mg daily  hydrochlorothiazide 12.5 mg daily  Glipizide 2.5 mg daily  Omeprazole 20 mg daily    NKDA       Are you currently taking medications (prescription, over-the-counter, herbal remedies, diet supplements)? If \"yes,\" please describe below.     YES - see above          HEALTH HISTORY (continued)          Do you have or have you ever had:                                                     Not  Yes    No     Sure                                                                          Not    Yes     No   Sure    1. Head/brain injuries or illness (e.g., concussion)  NO 16. Dizziness, headaches, numbness, tingling, or memory loss   NO   2. Seizures, epilepsy  NO 17. Unexplained weight loss   NO   3. " "Eye problems (except glasses or contacts)  NO 18. Stroke, mini stroke (TIA), paralysis, or weakness   NO   4. Ear and/or hearing problems   NO 19. Missing or limited use of arm, hand, finger, leg, foot, toe   NO   5. Heart disease, heart attack, bypass, or other heart problems   NO 20. Neck or back problems   NO   6. Pacemaker, stents, implantable devices, or other heart procedures   NO 21. Bone, muscle, joint or nerve problems   NO   7. High blood preassure   YES 22. Blood clots or bleeding problems   NO   8. High cholesterol   YES 23. Cancer   NO   9. Chronic (long term) cough, shortness of breath, or other breathing problems   NO 24. Chronic (long term) infection or other chronic disease   NO   10. Lung disease (e.g., asthma)   NO 25. Sleep disorders, pauses in breathing while asleep, daytime sleepiness, loud snoring   NO   11. Kidney problems, kidney stones, or pain/problems with urination   NO 26. Have you ever had a sleep test? (e.g., sleep apnea)   NO   12. Stomach, liver, or digestive problems   NO 27. Have you ever spent the night in the hospital?   NO   13. Diabetes or blood sugar problems   Yes 28. Have you ever had a broken bone?   NO         Insulin used   NO 29. Have you ever used or do you now use tobacco?   NO   14. Anxiety, depression, nervousness, other mental health problems  NO 30. Do you currently drink alcohol?    Yes  3/wk   15. Fainting or passing out   NO 31.  Have you used an illegal substance within the past two years?    NO     32. Have you ever failed a drug test or been dependent on an illegal substance?    NO     Other health condition(s) not described above:   NO    Extensive review - see above     Did you answer \"yes\" to any of questions 1-32?  If so, please comment further on those health conditions below:   YES    Extensive review - see above            'S SIGNATURE  I certify that the above information is accurate and complete. I understand that inaccurate, false or " "missing information may invalidate the examination and my Medical Examiner's Certificate, that submission of fraudulent or intentionally false information is a violation of 49CFR 390.35, and that submission of fraudulent or intentionally false information may subject me to civil or ciminal penalties under 49 .37 and 49  Appendices A and B.     's signature:____________________________        Date: 1/7/2022                                         Signature if printed       Section 2. Examination Report (to be filled out by the medical examiner)      HEALTH HISTORY REVIEW  Review and discuss pertinent  answers and any available medical records. 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       Extensive review - see above            TESTING     Pulse rate: 112     Pulse rhythm regular: YES  Height: 6 feet 0 inches  Weight: 237 pounds    Blood Pressure  Blood Pressure: 138/86 Systolic  138 Diastolic 86  Sitting yes  Second Reading (optional)   Other Testing if indicated          Urinalysis  Urinalysis is required. Numerical readings must be recorded.  Urine Specimen Specific Gravity Protein Blood Sugar    1.010 NEGATIVE NEGATIVE NEGATIVE   Protein, blood or sugar in the urine may be an indication for further testing to rule out any underlying medical problem.    Vision  Standard is at least 20/40 acuity (Snellen) in each eye with or without correction. At least 70  field of vision in horizontal meridian measured in each eye. The use of corrective lenses should be noted on the Medical Examiner's Certificate.    ACUITY UNCORRECTED  HORIZONTAL FIELD OF VISION   Right Eye 20/20  Greater than 70 degrees   Left Eye 20/20  Greater than 70 degrees   Both Eyes 20/20       Applicant can recognize and distinguish among traffic control signals and devices showing red, green and keagan " colors? Yes    Monocular vision: No    Referred to ophthalmologist or optometrist?  No    Received documentation from ophthalmologist or optometrist?  No    HEARING   Standard: Must first perceive forced whispered voice at not less than 5 feet OR average hearing loss of less than or equal to 40 dB, in better ear (with or without hearing aid).    Check if hearing aid used for test:  Neither    Whispered voice test:    Record the distance from the individual at which a forced whispered voice can first be heard:        Right Ear: greater than 5 feet                  Left Ear: greater than 5 feet             PHYSICAL EXAMINATION  The presence of a certain condition may not necessarily disqualify a , particularly if the condition is controlled adequately, is not likely to worsen, or is readily amenable to treatment. Even if a condition does not disqualify a , the Medical Examiner may consider deferring the  temporarily. Also, the  should be advised to take the necessary steps to correct the condition as soon as possible, particularly if neglecting the condition, could result in more serious illness that might affect driving.    Check the body systems for abnormalities.  BODY SYSTEM Normal or Abnormal   1. General  Normal   2. Skin Normal   3. Eyes Normal   4. Ears Normal   5. Mouth/throat Normal   6. Cardiovascular Normal   7. Lungs/chest Normal   8. Abdomen Normal   9. Genito-urinary System including hernias Normal   10. Back/Spine Normal   11. Extremities/joints Normal   12. Neurological system including reflexes Normal   13. Gait Normal   14. Vascular system Normal     Discuss any abnormal answers in detail in the space below and indicate whether it would affect the 's ability to operate a CMV. Enter applicable item number before each comment.     no concerns            Please complete only one of the following (Federal or State) Medical Examiner Determination sections:    MEDICAL  EXAMINER DETERMINATION (Federal)  Use this section for examinations performed in accordance with the Federal Motor Carrier Safety Regulations (49 ..49):    [  ] Does not meet standards (specify reason) ________________________________________________________________________________  [  ] Meets standards in 49 .41; qualifies for 2-year certificate  [ X ] Meets standards, but periodic monitoring required (specify reason) ___________________________________________________________         qualified for:   [  ] 3 months               [  ] 6 months               [ X ] 1 year            [  ] other (specify): ________________________________  [  ]  Wearing corrective lenses        [  ] Wearing hearing aid        [  ] Accompanied by a waiver/exception(specify type): ____________________  [  ] Accompanied by a Skill Performance Evaluation (SPE) Certificate    [  ]  Qualified by operation of 49 .64 (Federal)  [  ] Driving within an exempt intracity zone (see 49 .62) (Federal)  [  ] Determination pending (specify reason) __________________________________________________________________________________        [  ] Return to medical exam office for follow-up on (must be 45 days or less _______________________________        [  ] Medical Examination Report amended (specify reason) ______________________________________________                    (if amended) Medical Examiner's Signature _____________________________________________________ Date: _______________  [  ] Incomplete examination (specify reason): _________________________________________________________________________________            If the  meets the standards outlined in 49 .41, then complete a Medical Examiner's Certificate as stated in 49 .43(h), as appropriate.     I have performed this evaluation for certification. I have personally reviewed all available records and recorded information pertaining  to this evaluation, and attest that to the best of my knowledge, I believe it to be true and correct.    Medical Examiner's Signature: _________________________________________                                                                                   (if printed)    Medical Examiner's Name: Kimani Ruiz MD  Medical Examiner's Address:   [address] [id number] [phone]  Dept: [phone]    Date Certificate Signed: 1/7/2022    Medical Examiner's State License, Certificate, or Registration Number: [id number]    [place]    [ X ] M.D.   [  ] D.O.   [  ] Physician Assistant   [  ] Chiropractor   [  ] Advanced Practice Nurse  [  ] Other Practitioner (specify) __________________________________________________    National Registry Number:  [id number]                Medical Examiner's Certificate Expiration Date: 1/7/2023                          Date submitted to registry: 1/7/22  Submitted by: Monisha Hawkins/MAURI

## 2022-12-27 ENCOUNTER — OFFICE VISIT (OUTPATIENT)
Dept: FAMILY MEDICINE | Facility: CLINIC | Age: 62
End: 2022-12-27

## 2022-12-27 DIAGNOSIS — Z02.89 ENCOUNTER FOR EXAMINATION REQUIRED BY DEPARTMENT OF TRANSPORTATION (DOT): Primary | ICD-10-CM

## 2022-12-27 LAB
BILIRUB UR QL: NORMAL
CLARITY: CLEAR
COLOR UR: YELLOW
GLUCOSE URINE: 500 MG/DL
HGB UR QL: NORMAL
KETONES UR QL: NORMAL MG/DL
NITRITE UR QL STRIP: NORMAL
PH UR STRIP: 6.5 PH (ref 5–7)
PROT UR QL: NORMAL MG/DL
SP GR UR STRIP: 1.02 (ref 1–1.03)
SPECIMEN VOL UR: NORMAL ML
UROBILINOGEN UR QL STRIP: 0.2 EU/DL (ref 0.2–1)
WBC #/AREA URNS HPF: NORMAL /[HPF]

## 2022-12-27 PROCEDURE — 81003 URINALYSIS AUTO W/O SCOPE: CPT | Performed by: FAMILY MEDICINE

## 2022-12-27 PROCEDURE — 99499 UNLISTED E&M SERVICE: CPT | Performed by: FAMILY MEDICINE

## 2022-12-27 NOTE — PROGRESS NOTES
Form MCSA-5875                                  [facility] No. [id number]  Expiration Date: 3/31/2025  MEDICAL EXAMINATION REPORT FORM  (FOR  MEDICAL CERTIFICATION)    SECTION 1.  Information (to be filled out by )    PERSONAL INFORMATION    Last Name: Jonny  First Name: Gerhard Brooks Initial: ZOYA  : 1960      Age: 62        Street Address: [address] [place] [address]   City: [place]   State/Province: [place]   Zip Code: [address]  's License Number: [id number]      Issuing State/Province: [place]       Phone: [phone]       E-mail (optional):   [email or web address] Applicant Sung*: yes   ID Verified by**:  Drivers License  Has your USDOT/FMCSA medical certificate ever been denied or issued for less than 2 years? YES       TESTING       Blood Pressure  Blood Pressure: 138 Systolic  90 Diastolic  Sitting   Second Reading (optional) 136/86  Other Testing if indicated    *       Urinalysis  Urinalysis is required. Numerical readings must be recorded.  Urine Specimen Specific Gravity Protein Blood Sugar    1.025 NEGATIVE NEGATIVE 2+   Protein, blood or sugar in the urine may be an indication for further testing to rule out any underlying medical problem.       PHYSICAL EXAMINATION  The presence of a certain condition may not necessarily disqualify a , particularly if the condition is controlled adequately, is not likely to worsen, or is readily amenable to treatment. Even if a condition does not disqualify a , the Medical Examiner may consider deferring the  temporarily. Also, the  should be advised to take the necessary steps to correct the condition as soon as possible, particularly if neglecting the condition, could result in more serious illness that might affect driving.    Discuss any abnormal answers in detail in the space below and indicate whether it would affect the 's ability to operate a CMV. Enter applicable item number before each comment.    SEE SCANNED  FORMS    PMH     Type 2 DM  Htn  Lipids     PSH     Appendix - 2012  Heart Ablation 2021     Meds     Rosuvastatin 10 mg daily  hydrochlorothiazide 12.5 mg daily  Glipizide 2.5 mg daily  Omeprazole 20 mg daily     NKDA        Please complete only one of the following (Federal or State) Medical Examiner Determination sections:      MEDICAL EXAMINER DETERMINATION (Federal)  Use this section for examinations performed in accordance with the Federal Motor Carrier Safety Regulations (49 ..49):    [  ] Does not meet standards (specify reason) ________________________________________________________________________________  [  ] Meets standards in 49 .41; qualifies for 2-year certificate  [ X ] Meets standards, but periodic monitoring required (specify reason) _DM 2 / Htn ______________________________________________________         qualified for:   [  ] 3 months               [  ] 6 months               [ X ] 1 year            [  ] other (specify): ________________________________  [  ]  Wearing corrective lenses        [  ] Wearing hearing aid        [  ] Accompanied by a waiver/exception(specify type): ____________________  [  ] Accompanied by a Skill Performance Evaluation (SPE) Certificate    [  ]  Qualified by operation of 49 .64 (Federal)  [  ] Driving within an exempt intracity zone (see 49 .62) (Federal)  [  ] Determination pending (specify reason) __________________________________________________________________________________        [  ] Return to medical exam office for follow-up on (must be 45 days or less _______________________________        [  ] Medical Examination Report amended (specify reason) ______________________________________________                    (if amended) Medical Examiner's Signature _____________________________________________________ Date: _______________  [  ] Incomplete examination (specify reason):  _________________________________________________________________________________            If the  meets the standards outlined in 49 .41, then complete a Medical Examiner's Certificate as stated in 49 .43(h), as appropriate.     I have performed this evaluation for certification. I have personally reviewed all available records and recorded information pertaining to this evaluation, and attest that to the best of my knowledge, I believe it to be true and correct.    Medical Examiner's Signature: _________________________________________                                                                                   (if printed)    Medical Examiner's Name: Kimani Ruiz MD  Medical Examiner's Address:   [address] [id number] [phone]  Dept: [phone]    Date Certificate Signed: 12/27/2022    Medical Examiner's State License, Certificate, or Registration Number: [id number]  [place]    [ X ] M.D.   [  ] D.O.   [  ] Physician Assistant   [  ] Chiropractor   [  ] Advanced Practice Nurse  [  ] Other Practitioner (specify) __________________________________________________    National Registry Number:  [id number]                Medical Examiner's Certificate Expiration Date:12/27/2023               Date submitted to registry: 12/28/2022  Submitted by: 12/28/2023

## 2023-12-20 ENCOUNTER — OFFICE VISIT (OUTPATIENT)
Dept: FAMILY MEDICINE | Facility: CLINIC | Age: 63
End: 2023-12-20

## 2023-12-20 VITALS
TEMPERATURE: 97.9 F | SYSTOLIC BLOOD PRESSURE: 128 MMHG | RESPIRATION RATE: 16 BRPM | OXYGEN SATURATION: 96 % | HEART RATE: 93 BPM | HEIGHT: 72 IN | DIASTOLIC BLOOD PRESSURE: 70 MMHG | WEIGHT: 243.8 LBS | BODY MASS INDEX: 33.02 KG/M2

## 2023-12-20 DIAGNOSIS — Z02.89 ENCOUNTER FOR EXAMINATION REQUIRED BY DEPARTMENT OF TRANSPORTATION (DOT): Primary | ICD-10-CM

## 2023-12-20 LAB
ALBUMIN UR-MCNC: NEGATIVE MG/DL
APPEARANCE UR: CLEAR
BILIRUB UR QL STRIP: NEGATIVE
COLOR UR AUTO: YELLOW
GLUCOSE UR STRIP-MCNC: 250 MG/DL
HGB UR QL STRIP: NEGATIVE
KETONES UR STRIP-MCNC: NEGATIVE MG/DL
LEUKOCYTE ESTERASE UR QL STRIP: NEGATIVE
NITRATE UR QL: NEGATIVE
PH UR STRIP: 6 [PH] (ref 5–7)
SP GR UR STRIP: 1.02 (ref 1–1.03)
UROBILINOGEN UR STRIP-ACNC: 0.2 E.U./DL

## 2023-12-20 PROCEDURE — 81003 URINALYSIS AUTO W/O SCOPE: CPT | Performed by: FAMILY MEDICINE

## 2023-12-20 PROCEDURE — 99499 UNLISTED E&M SERVICE: CPT | Performed by: FAMILY MEDICINE

## 2023-12-20 NOTE — PROGRESS NOTES
Form MCSA-5875                                  [facility] No. [id number]  Expiration Date: 3/31/2025  MEDICAL EXAMINATION REPORT FORM  (FOR  MEDICAL CERTIFICATION)    SECTION 1.  Information (to be filled out by )    PERSONAL INFORMATION    Last Name: Jonny  First Name: Gerhard Brooks Initial: JOHNNY  :  1960    Age: 63        Street Address: [address] [place] [address].   City: [place]    State/Province: [place]   Zip Code: [address]  's License Number: [id number]    Issuing State/Province: [place]       Phone: [phone]       E-mail (optional): [email or web address]  CLP/CDL Applicant Sung*: yes   ID Verified by**:  DL  Has your USDOT/FMCSA medical certificate ever been denied or issued for less than 2 years? Yes      SEE SCANNED FORMS     PMH     Type 2 DM  Htn  Lipids     PSH     Appendix -   Heart Ablation   Ear Surgery -      Meds     Rosuvastatin 10 mg daily  hydrochlorothiazide 12.5 mg daily  Glipizide 2.5 mg daily  Omeprazole 20 mg daily     NKDA    TESTING       Blood Pressure  Blood Pressure: 128 Systolic  70 Diastolic  Sitting yes   Second Reading (optional) NA  Other Testing if indicated    *       Urinalysis  Urinalysis is required. Numerical readings must be recorded.  Urine Specimen Specific Gravity Protein Blood Sugar    1.025 NEGATIVE NEGATIVE 1+   Protein, blood or sugar in the urine may be an indication for further testing to rule out any underlying medical problem.           PHYSICAL EXAMINATION  The presence of a certain condition may not necessarily disqualify a , particularly if the condition is controlled adequately, is not likely to worsen, or is readily amenable to treatment. Even if a condition does not disqualify a , the Medical Examiner may consider deferring the  temporarily. Also, the  should be advised to take the necessary steps to correct the condition as soon as possible, particularly if neglecting the  condition, could result in more serious illness that might affect driving.      Discuss any abnormal answers in detail in the space below and indicate whether it would affect the 's ability to operate a CMV. Enter applicable item number before each comment.    SEE SCANNED FORMS            Please complete only one of the following (Federal or State) Medical Examiner Determination sections:      MEDICAL EXAMINER DETERMINATION (Federal)  Use this section for examinations performed in accordance with the Federal Motor Carrier Safety Regulations (49 ..49):    [  ] Does not meet standards (specify reason) ________________________________________________________________________________  [  ] Meets standards in 49 .41; qualifies for 2-year certificate  [ X ] Meets standards, but periodic monitoring required (specify reason) ___________________________________________________________         qualified for:   [  ] 3 months               [  ] 6 months               [ X ] 1 year            [  ] other (specify): ________________________________  [  ]  Wearing corrective lenses        [  ] Wearing hearing aid        [  ] Accompanied by a waiver/exception(specify type): ____________________  [  ] Accompanied by a Skill Performance Evaluation (SPE) Certificate    [  ]  Qualified by operation of 49 .64 (Federal)  [  ] Driving within an exempt intracity zone (see 49 .62) (Federal)  [  ] Determination pending (specify reason) __________________________________________________________________________________        [  ] Return to medical exam office for follow-up on (must be 45 days or less _______________________________        [  ] Medical Examination Report amended (specify reason) ______________________________________________                    (if amended) Medical Examiner's Signature _____________________________________________________ Date: _______________  [  ] Incomplete  examination (specify reason): _________________________________________________________________________________            If the  meets the standards outlined in 49 .41, then complete a Medical Examiner's Certificate as stated in 49 .43(h), as appropriate.     I have performed this evaluation for certification. I have personally reviewed all available records and recorded information pertaining to this evaluation, and attest that to the best of my knowledge, I believe it to be true and correct.    Medical Examiner's Signature: _________________________________________                                                                                   (if printed)  Medical Examiner's Name: Kimani Ruiz MD    Medical Examiner's Address:   [address] [address] [phone]  Dept: [phone]    Date Certificate Signed: 12/20/2023    Medical Examiner's State License, Certificate, or Registration Number: [id number]    [place]    [ X ] M.D.   [  ] D.O.   [  ] Physician Assistant   [  ] Chiropractor   [  ] Advanced Practice Nurse  [  ] Other Practitioner (specify) __________________________________________________    National Registry Number:  [id number]           Medical Examiner's Certificate Expiration Date: 12/20/2024               Date submitted to registry: 12/20/2023  Submitted by: Monisha Hawkins/MAURI

## 2024-12-23 ENCOUNTER — OFFICE VISIT (OUTPATIENT)
Dept: FAMILY MEDICINE | Facility: CLINIC | Age: 64
End: 2024-12-23

## 2024-12-23 VITALS
HEART RATE: 104 BPM | TEMPERATURE: 98.4 F | SYSTOLIC BLOOD PRESSURE: 154 MMHG | DIASTOLIC BLOOD PRESSURE: 96 MMHG | WEIGHT: 240 LBS | RESPIRATION RATE: 16 BRPM | HEIGHT: 72 IN | OXYGEN SATURATION: 100 % | BODY MASS INDEX: 32.51 KG/M2

## 2024-12-23 DIAGNOSIS — Z02.89 ENCOUNTER FOR EXAMINATION REQUIRED BY DEPARTMENT OF TRANSPORTATION (DOT): Primary | ICD-10-CM

## 2024-12-23 LAB
ALBUMIN UR-MCNC: NEGATIVE MG/DL
APPEARANCE UR: CLEAR
BILIRUB UR QL STRIP: NEGATIVE
COLOR UR AUTO: YELLOW
GLUCOSE UR STRIP-MCNC: >=1000 MG/DL
HGB UR QL STRIP: NEGATIVE
KETONES UR STRIP-MCNC: NEGATIVE MG/DL
LEUKOCYTE ESTERASE UR QL STRIP: NEGATIVE
NITRATE UR QL: NEGATIVE
PH UR STRIP: 5.5 [PH] (ref 5–7)
SP GR UR STRIP: 1.02 (ref 1–1.03)
UROBILINOGEN UR STRIP-ACNC: 0.2 E.U./DL

## 2024-12-23 PROCEDURE — 99499 UNLISTED E&M SERVICE: CPT | Performed by: FAMILY MEDICINE

## 2024-12-23 PROCEDURE — 81003 URINALYSIS AUTO W/O SCOPE: CPT | Performed by: FAMILY MEDICINE

## 2024-12-23 NOTE — PROGRESS NOTES
Form [id number]                                  [facility] [id number]  Expiration Date: 3/31/2025  MEDICAL EXAMINATION REPORT FORM  (FOR  MEDICAL CERTIFICATION)    SECTION 1.  Information (to be filled out by )    PERSONAL INFORMATION    Last Name: Jonny  First Name: Gerhard Broosk Initial: ZOYA  :  1960       Age: 64        Street Address: [address] [place] [address]   City: [place]   State/Province: [place]   Zip Code: [address]  's License Number: [id number]      Issuing State/Province: [place]       Phone: [phone]       E-mail (optional): [email or web address]  CLP/CDL Applicant Sung*: yes   ID Verified by**:  's license  Has your [employer] medical certificate ever been denied or issued for less than 2 years? YES     PMH     Type 2 DM  Htn  Lipids     PSH     Appendix -   Heart Ablation   Ear Surgery -      Meds     Rosuvastatin 20 mg daily  Losartan 100 mg daily   Glipizide 10 mg daily  Omeprazole 20 mg daily     NKDA    TESTING       Blood Pressure  Blood Pressure: 154 Systolic  96 Diastolic  Sitting yes  Second Reading (optional) 136/78  Other Testing if indicated    *       Urinalysis  Urinalysis is required. Numerical readings must be recorded.  Urine Specimen Specific Gravity Protein Blood Sugar    1.025 NEGATIVE NEGATIVE >=1000   Protein, blood or sugar in the urine may be an indication for further testing to rule out any underlying medical problem.         PHYSICAL EXAMINATION  The presence of a certain condition may not necessarily disqualify a , particularly if the condition is controlled adequately, is not likely to worsen, or is readily amenable to treatment. Even if a condition does not disqualify a , the Medical Examiner may consider deferring the  temporarily. Also, the  should be advised to take the necessary steps to correct the condition as soon as possible, particularly if neglecting the condition, could result in  more serious illness that might affect driving.      Discuss any abnormal answers in detail in the space below and indicate whether it would affect the 's ability to operate a CMV. Enter applicable item number before each comment.    SEE SCANNED FORMS            Please complete only one of the following (Federal or State) Medical Examiner Determination sections:      MEDICAL EXAMINER DETERMINATION (Federal)  Use this section for examinations performed in accordance with the Federal Motor Carrier Safety Regulations (49 ..49):    [  ] Does not meet standards (specify reason) ________________________________________________________________________________  [  ] Meets standards in 49 .41; qualifies for 2-year certificate  [ X ] Meets standards, but periodic monitoring required (specify reason) _DM, Htn__________________________________________________________         qualified for:   [  ] 3 months               [  ] 6 months               [ X ] 1 year            [  ] other (specify): ________________________________  [  ]  Wearing corrective lenses        [  ] Wearing hearing aid        [  ] Accompanied by a waiver/exception(specify type): ____________________  [  ] Accompanied by a Skill Performance Evaluation (SPE) Certificate    [  ]  Qualified by operation of 49 .64 (Federal)  [  ] Driving within an exempt intracity zone (see 49 .62) (Federal)  [  ] Determination pending (specify reason) __________________________________________________________________________________        [  ] Return to medical exam office for follow-up on (must be 45 days or less _______________________________        [  ] Medical Examination Report amended (specify reason) ______________________________________________                    (if amended) Medical Examiner's Signature _____________________________________________________ Date: _______________  [  ] Incomplete examination (specify  reason): _________________________________________________________________________________            If the  meets the standards outlined in 49 .41, then complete a Medical Examiner's Certificate as stated in 49 .43(h), as appropriate.     I have performed this evaluation for certification. I have personally reviewed all available records and recorded information pertaining to this evaluation, and attest that to the best of my knowledge, I believe it to be true and correct.    Medical Examiner's Signature: _________________________________________                                                                                   (if printed)    Medical Examiner's Name: Kimani Ruiz MD    Medical Examiner's Address:   [address] [id number] [phone]  Dept: [phone]    Date Certificate Signed: 12/23/2024    Medical Examiner's State License, Certificate, or Registration Number: [id number]    Issuing State:  [place]    [ X ] M.D.   [  ] D.O.   [  ] Physician Assistant   [  ] Chiropractor   [  ] Advanced Practice Nurse  [  ] Other Practitioner (specify) __________________________________________________    National Registry Number:  [id number]             Medical Examiner's Certificate Expiration Date: 12/23/2025               Date submitted to registry:  [id number]  Submitted by: NIKKIE